# Patient Record
Sex: FEMALE | URBAN - METROPOLITAN AREA
[De-identification: names, ages, dates, MRNs, and addresses within clinical notes are randomized per-mention and may not be internally consistent; named-entity substitution may affect disease eponyms.]

---
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
## 2023-02-12 ENCOUNTER — HEALTH MAINTENANCE LETTER (OUTPATIENT)
Age: 18
End: 2023-02-12

## 2023-09-14 DIAGNOSIS — R30.0 DYSURIA: ICD-10-CM

## 2023-09-14 DIAGNOSIS — N94.10 FEMALE DYSPAREUNIA: Primary | ICD-10-CM

## 2023-11-14 ENCOUNTER — VIRTUAL VISIT (OUTPATIENT)
Dept: PSYCHOLOGY | Facility: CLINIC | Age: 18
End: 2023-11-14
Payer: COMMERCIAL

## 2023-11-14 DIAGNOSIS — F41.1 GENERALIZED ANXIETY DISORDER: Primary | ICD-10-CM

## 2023-11-14 DIAGNOSIS — F33.0 MAJOR DEPRESSIVE DISORDER, RECURRENT EPISODE, MILD (H): ICD-10-CM

## 2023-11-14 PROCEDURE — 90791 PSYCH DIAGNOSTIC EVALUATION: CPT | Mod: 95 | Performed by: PSYCHOLOGIST

## 2023-11-14 ASSESSMENT — COLUMBIA-SUICIDE SEVERITY RATING SCALE - C-SSRS
REASONS FOR IDEATION LIFETIME: MOSTLY TO END OR STOP THE PAIN (YOU COULDN'T GO ON LIVING WITH THE PAIN OR HOW YOU WERE FEELING)
1. IN THE PAST MONTH, HAVE YOU WISHED YOU WERE DEAD OR WISHED YOU COULD GO TO SLEEP AND NOT WAKE UP?: NO
ATTEMPT LIFETIME: NO
3. HAVE YOU BEEN THINKING ABOUT HOW YOU MIGHT KILL YOURSELF?: NO
TOTAL  NUMBER OF ABORTED OR SELF INTERRUPTED ATTEMPTS LIFETIME: NO
1. HAVE YOU WISHED YOU WERE DEAD OR WISHED YOU COULD GO TO SLEEP AND NOT WAKE UP?: YES
2. HAVE YOU ACTUALLY HAD ANY THOUGHTS OF KILLING YOURSELF?: NO
TOTAL  NUMBER OF INTERRUPTED ATTEMPTS LIFETIME: NO
6. HAVE YOU EVER DONE ANYTHING, STARTED TO DO ANYTHING, OR PREPARED TO DO ANYTHING TO END YOUR LIFE?: NO
4. HAVE YOU HAD THESE THOUGHTS AND HAD SOME INTENTION OF ACTING ON THEM?: NO
5. HAVE YOU STARTED TO WORK OUT OR WORKED OUT THE DETAILS OF HOW TO KILL YOURSELF? DO YOU INTEND TO CARRY OUT THIS PLAN?: NO
2. HAVE YOU ACTUALLY HAD ANY THOUGHTS OF KILLING YOURSELF?: YES

## 2023-11-14 ASSESSMENT — PATIENT HEALTH QUESTIONNAIRE - PHQ9
10. IF YOU CHECKED OFF ANY PROBLEMS, HOW DIFFICULT HAVE THESE PROBLEMS MADE IT FOR YOU TO DO YOUR WORK, TAKE CARE OF THINGS AT HOME, OR GET ALONG WITH OTHER PEOPLE: VERY DIFFICULT
SUM OF ALL RESPONSES TO PHQ QUESTIONS 1-9: 9
SUM OF ALL RESPONSES TO PHQ QUESTIONS 1-9: 9

## 2023-11-14 ASSESSMENT — ANXIETY QUESTIONNAIRES
GAD7 TOTAL SCORE: 6
IF YOU CHECKED OFF ANY PROBLEMS ON THIS QUESTIONNAIRE, HOW DIFFICULT HAVE THESE PROBLEMS MADE IT FOR YOU TO DO YOUR WORK, TAKE CARE OF THINGS AT HOME, OR GET ALONG WITH OTHER PEOPLE: VERY DIFFICULT
6. BECOMING EASILY ANNOYED OR IRRITABLE: SEVERAL DAYS
7. FEELING AFRAID AS IF SOMETHING AWFUL MIGHT HAPPEN: SEVERAL DAYS
GAD7 TOTAL SCORE: 6
1. FEELING NERVOUS, ANXIOUS, OR ON EDGE: MORE THAN HALF THE DAYS
2. NOT BEING ABLE TO STOP OR CONTROL WORRYING: SEVERAL DAYS
3. WORRYING TOO MUCH ABOUT DIFFERENT THINGS: SEVERAL DAYS
5. BEING SO RESTLESS THAT IT IS HARD TO SIT STILL: NOT AT ALL
4. TROUBLE RELAXING: NOT AT ALL
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
## 2023-12-15 ENCOUNTER — DOCUMENTATION ONLY (OUTPATIENT)
Dept: PSYCHOLOGY | Facility: CLINIC | Age: 18
End: 2023-12-15

## 2023-12-15 NOTE — PROGRESS NOTES
Name: Bryleigh Baker  MRN: [id number]  : 2005     Client completed the [place] Multiphasic Personality Inventory-3 (MMPI-3), a self-report personality inventory, as part of her evaluation. Validity scales indicate that the client was able to comprehend and respond relevantly to the test items. Client responded in an open and consistent manner, resulting in a valid profile. She reports a diffuse pattern of cognitive difficulties including memory problems, difficulties with attention and concentration and possible confusion. Her responses indicate significant emotional distress. She reports feeling sad and unhappy and being dissatisfied with her current life circumstances. She reports being passive, indecisive and inefficacious. She reports a lack of positive emotional experiences, significant anhedonia and lack of interest. She reports an above average level of stress. She reports worry, anxiety, and rumination. Client is reporting a lack of positive emotional experiences, anhedonia and lack of interest. Client is reporting an above average level of stress, anxiety, and excessive worry and rumination. She has fears that significantly restrict normal activity in and outside of the home.

## 2023-12-19 ENCOUNTER — VIRTUAL VISIT (OUTPATIENT)
Dept: PSYCHOLOGY | Facility: CLINIC | Age: 18
End: 2023-12-19
Payer: COMMERCIAL

## 2023-12-19 DIAGNOSIS — F33.0 MAJOR DEPRESSIVE DISORDER, RECURRENT EPISODE, MILD (H): ICD-10-CM

## 2023-12-19 DIAGNOSIS — F41.1 GENERALIZED ANXIETY DISORDER: Primary | ICD-10-CM

## 2023-12-19 PROCEDURE — 90832 PSYTX W PT 30 MINUTES: CPT | Mod: 95 | Performed by: PSYCHOLOGIST
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
## 2024-01-11 ENCOUNTER — DOCUMENTATION ONLY (OUTPATIENT)
Dept: PSYCHOLOGY | Facility: CLINIC | Age: 19
End: 2024-01-11

## 2024-01-11 NOTE — PROGRESS NOTES
"Client Name: Bryleigh Baker   MRN: [id number]  : 2005    Moo Adult ADHD Rating Scale-IV: Self and Other Reports (BAARS-IV)  The BAARS-IV assesses for symptoms of ADHD that are experienced in one's daily life. This assessment measure includes self and collateral rating scales designed to provide information regarding current and childhood symptoms of ADHD including inattention, hyperactivity, and impulsivity. Self-report scores are reported as percentiles. Scores at the 76th-83rd percentile are considered marginal, scores at the 84th-92nd percentile are considered borderline, scores at the 93rd-95th percentile are considered mild, scores at the 96th-98th percentile are considered moderate, and those at the 99th percentile are considered severe. Collateral or \"other\" rating scales are reported as number of symptoms observed in comparison to those reported by the client. Norms and percentile scores are not available for collateral reports.      Current Symptoms Scale--Self Report:   Client completed the self-report inventory of current symptoms. The results indicate that the client's Total ADHD Score was 36 which places them in the 90th percentile for overall ADHD symptoms. In addition, the client endorsed the following occur \"often\" or \"very often\": 4/9 (95th percentile) Inattention symptoms, 2/9 (85th percentile) Hyperactivity/Impulsivity symptoms, and 5/9 (94th percentile) Sluggish Cognitive Tempo symptoms. Overall, the results suggest the client is reporting mild symptoms of inattention and borderline symptoms of hyperactivity/impulsivity at this time.      Current Symptoms Scale--Other Report:  Client's friend completed the collateral report inventory of current symptoms. Based on the collateral contact's observation of symptoms, the client demonstrates the following \"often\" or \"very often\": 4/9 Inattention symptoms, 5/5 Hyperactivity symptoms, 4/4 Impulsivity symptoms, and 5/9 Sluggish Cognitive Tempo " "symptoms. The client's Total ADHD Score was 53. The collateral- and self-report scores are similar and suggest Client experiences symptoms of inattention at this time. Her friend noted more symptoms of hyperactivity/impulsivity than Client reported.     Childhood Symptoms Scale--Self-Report:  Client completed the self-report inventory of childhood symptoms. The results indicate that the client's Total ADHD Score was 24 which places them in the 51-75th percentile for overall ADHD symptoms in childhood. In addition, the client endorsed having experienced the following \"often\" or \"very often\": 0/9 (1-75th percentile) Inattention symptoms and 0/9 (1-75 percentile) Hyperactivity-Impulsivity symptoms. Overall, the results suggest the client did not report experiencing symptoms of ADHD in childhood.    Childhood Symptoms Scale--Other Report:  Client's childhood friend completed the collateral report inventory of childhood symptoms. Based on the collateral contact's recollection of client's childhood symptoms, the client demonstrated the following \"often\" or \"very often\": 3/9 Inattention symptoms and 5/9 Hyperactivity-Impulsivity symptoms. The client's Total ADHD Score was 44. The collateral- and self-report scores are discrepant. Her friend noted more symptoms of ADHD in childhood than Client reported.    Moo Functional Impairment Scale: Self and Other Reports (BFIS)  The BFIS is used to assess an individuals' psychosocial impairment in major life/daily activities that may be due to a mental health disorder. This assessment measure includes self and collateral rating scales. Self-report scores are reported as percentiles. Scores at the 76th-83rd percentile are considered marginal, scores at the 84th-92nd percentile are considered borderline, scores at the 93rd-95th percentile are considered mild, scores at the 96th-98th percentile are considered moderate, and those at the 99th percentile are considered severe. " "Collateral or \"other\" rating scales are reported as number of symptoms observed in comparison to those reported by the client. Norms and percentile scores are not available for collateral reports.      Results indicate the client identified impairment (scores at or greater than 93rd percentile) in the following areas: home-family, money management, and health maintenance. The client's Mean Impairment Score was 3.23 (51-75th percentile) indicating the client is not reporting impairment in functioning across domains. Client's friend completed the collateral rating scale, which indicated similar results (e.g., Mean Impairment Score of 3.4). She noted impairment in the areas of: work, social-strangers, and health maintenance.     Moo Deficits in Executive Functioning Scale (BDEFS)  The BDEFS is a measure used for evaluating dimensions of adult executive functioning in daily life. This assessment measure includes self and collateral rating scales. Self-report scores are reported as percentiles. Scores at the 76th-83rd percentile are considered marginal, scores at the 84th-92nd percentile are considered borderline, scores at the 93rd-95th percentile are considered mild, scores at the 96th-98th percentile are considered moderate, and those at the 99th percentile are considered severe. Collateral or \"other\" rating scales are reported as number of symptoms observed in comparison to those reported by the client. Norms and percentile scores are not available for collateral reports.      Results indicate the client's Total Executive Functioning Score was 195 (92nd percentile). The ADHD-Executive Functioning Index score was 22 (81st percentile). These scores suggest the client has borderline deficits in executive functioning. Client noted the following deficits: self-organization/problem solving (mild); and self-motivation (mild). Client's friend completed the collateral report which indicated similar results. They noted a " deficit in the area of: self-organization/problem-solving.    Generalized Anxiety Disorder Questionnaire (GIANFRANCO-7)  This questionnaire is designed to screen for anxiety in adults. Based on the client's score of 7, Client is reporting severe symptoms of anxiety at this time. Client endorsed the following symptoms of anxiety: feeling nervous/anxious/on edge; worrying about many different things; difficulty controlling worries, trouble relaxing; and feeling afraid as if something awful might happen.    Patient Health Questionnaire- 9 (PHQ-9)   This questionnaire is designed to screen for depression in adults. Based on the client's score of 12, Client is reporting moderate symptoms of depression at this time. Client identified the following symptoms of depression: little interest or pleasure in doing things; feeling down/depressed/hopeless; trouble falling or staying asleep or sleeping too much, feeling tired or having little energy, poor appetite or overeating; and poor concentration.

## 2024-01-22 ENCOUNTER — VIRTUAL VISIT (OUTPATIENT)
Dept: PSYCHOLOGY | Facility: CLINIC | Age: 19
End: 2024-01-22
Payer: COMMERCIAL

## 2024-01-22 ENCOUNTER — DOCUMENTATION ONLY (OUTPATIENT)
Dept: PSYCHOLOGY | Facility: CLINIC | Age: 19
End: 2024-01-22

## 2024-01-22 DIAGNOSIS — F33.0 MAJOR DEPRESSIVE DISORDER, RECURRENT EPISODE, MILD (H): ICD-10-CM

## 2024-01-22 DIAGNOSIS — F90.2 ATTENTION DEFICIT HYPERACTIVITY DISORDER, COMBINED TYPE: ICD-10-CM

## 2024-01-22 DIAGNOSIS — F41.1 GENERALIZED ANXIETY DISORDER: Primary | ICD-10-CM

## 2024-01-22 PROCEDURE — 96130 PSYCL TST EVAL PHYS/QHP 1ST: CPT | Mod: 93 | Performed by: PSYCHOLOGIST

## 2024-01-22 PROCEDURE — 96131 PSYCL TST EVAL PHYS/QHP EA: CPT | Mod: 93 | Performed by: PSYCHOLOGIST

## 2024-01-22 NOTE — PROGRESS NOTES
CNS Vital Signs Neurocognitive Battery   The CNS Vital Signs Neurocognitive Battery is a remotely-administered assessment comprised of seven core subtests to individually measure the patient's verbal memory, visual memory, motor speed, psychomotor speed, reaction time, focus, ability to sustain attention and ability to adapt to changing rules and tasks.        Above average domain scores indicate a standard score (SS) greater than 109 or a Percentile Rank ([place]) greater than 74, indicating a high functioning test subject. Average is a SS  or [place] 25-74, indicating normal function. Low Average is a SS 80-89 or [place] 9-24 indicating a slight deficit or impairment. Below Average is a SS 70-79 or [place] 2-8, indicating a moderate level of deficit or impairment. Very Low is a SS less than 70 or a [place] less than 2, indicating a deficit and impairment.  Validity Indicator denotes a guideline for representing the possibility of an invalid test or domain score, and can be influenced by patient understanding, effort, or other conditions.     The patient's results are detailed below:      Domain  Standard Score  Percentile  Description  Validity    Neurocognitive Index   88 21 Low Average Y   Composite?Memory?Measure  101 53 Average Y   Verbal Memory  100 50 Average Y   Visual?Memory  102 55 Average Y   Psychomotor Speed  87 19 Low Average Y   Reaction Time  68 2 Very Low Y   Complex Attention  98 45 Average Y   Cognitive Flexibility  88 21 Low Average Y   Processing Speed  85 16 Low Average Y   Executive Function  88 21 Low Average Y   Simple Attention  108 70 Average Y   Motor Speed  92 30 Average Y      Neurocognitive?Index?(NCI): Measures an average score derived from the domain scores or a general assessment of the overall neurocognitive status of the patient. The patient's NCI score is 88, with a percentile of 21, and falls within the Low Average range.      Composite?Memory: Measures how well subject can recognize, remember,  and retrieve words and geometric figures, and is comprised of the Visual and Verbal Memory domains. The patient's Composite Memory score is 101, with a percentile of 53, and falls within the Average range.      Verbal?Memory: Measures how well subject can recognize, remember, and retrieve words. The patient's Verbal Memory score is 100, with a percentile of 50, and falls within the Average range.      Visual?Memory: Measures how well subject can recognize, remember and retrieve geometric figures. The patient's Visual Memory score is 102, with a percentile of 55, and falls within the Average range.      Psychomotor?Speed: Measures how well a subject perceives, attends, responds to complex visual-perceptual information and performs simple fine motor coordination, and is comprised of the Motor Speed and Processing Speed indexes. The patient's Psychomotor Speed score is 87, with a percentile of 19, and falls within the Low Average range.      Reaction?Time: Measures how quickly the subject can react, in milliseconds, to a simple and increasingly complex direction set. The patient's Reaction Time score is 68, with a percentile of 2, and falls within the Very Low range.      Complex?Attention: Measures the ability to track and respond to a variety of stimuli over lengthy periods of time and/or perform complex mental tasks requiring vigilance quickly and accurately. The patient's Complex Attention score is 98, with a percentile of 45, and falls within the Average range.      Cognitive?Flexibility: Measures how well subject is able to adapt to rapidly changing and increasingly complex set of directions and/or to manipulate the information. The patient's Cognitive Flexibility score is 88, with a percentile of 21, and falls within the Low Average range.      Processing?Speed: Measures how well a subject recognizes and processes information i.e., perceiving, attending/responding to incoming information, motor speed, fine motor  coordination, and visual-perceptual ability. The patient's Processing Speed score is 85, with a percentile of 16, and falls within the Low Average range.      Executive?Function: Measures how well a subject recognizes rules, categories, and manages or navigates rapid decision making. The patient's Executive Function score is 88, with a percentile of 21, and falls within the Low Average range.      Simple?Attention: Measures the ability to track and respond to a single defined stimulus over lengthy periods of time while performing vigilance and response inhibition quickly and accurately to a simple task. The patient's Simple Attention score is 108, with a percentile of 70, and falls within the Average range.      Motor?Speed: Measure: Ability to perform simple movements to produce and satisfy an intention towards a manual action and goal. The patient's Motor Speed score is 92, with a percentile of 30, and falls within the Average range.

## 2024-01-22 NOTE — PROGRESS NOTES
[facility]   ADHD Evaluation      Patient: Bryleigh Baker  YOB: 2005  MRN: [id number]     Date(s) of assessment: Diagnostic Assessment (11/14/23; 12/19/23); MMPI-3 (Administered 12/18/23; Interpreted on 12/15/23); Moo self-report and collateral measures scored and interpreted (1/11/24); CNS Vital Signs (Administered 1/21/24; Interpreted 1/22/24)     Information about appointment:   Client attended two sessions to aid in determining client's mental health diagnosis or diagnoses and treatment recommendations that best address client concerns. Available medical records were reviewed. There were no previous psychological evaluations for review. A diagnostic assessment was conducted at the initial appointment. Client completed several rating scales to assist in assessing attention-related and other mental health symptoms that may be causing impairments in functioning. Rating scales were also completed by a collateral contact. Personality testing was also completed to aid in diagnostic clarification.   ?   Assessment tools:    Moo Adult ADHD Rating Scale-IV: Self and Other Reports (BAARS-IV), Moo Functional Impairment Scale: Self and Other Reports (BFIS), Moo Deficits in Executive Functioning Scale: Self and Other Reports (BDEFS), Patient Health Questionnaire-9 (PHQ-9), and Generalized Anxiety Disorder-7 (GIANFRANCO-7); [place] Multiphasic Personality Inventory-Third Edition (MMPI-3); CNS Vital Signs *Testing administered remotely    ?   Assessment Results:   Behavioral Observations:   Client arrived to each session on-time. She was pleasant and cooperative at all times. Client did not demonstrate difficulties with inattention or hyperactivity/impulsivity during the sessions. The following results are likely to be an accurate reflection of Client's current functioning.      Moo Adult ADHD Rating Scale-IV: Self and Other Reports (BAARS-IV)  The BAARS-IV assesses for symptoms of  "ADHD that are experienced in one's daily life. This assessment measure includes self and collateral rating scales designed to provide information regarding current and childhood symptoms of ADHD including inattention, hyperactivity, and impulsivity. Self-report scores are reported as percentiles. Scores at the 76th-83rd percentile are considered marginal, scores at the 84th-92nd percentile are considered borderline, scores at the 93rd-95th percentile are considered mild, scores at the 96th-98th percentile are considered moderate, and those at the 99th percentile are considered severe. Collateral or \"other\" rating scales are reported as number of symptoms observed in comparison to those reported by the client. Norms and percentile scores are not available for collateral reports.      Current Symptoms Scale--Self Report:   Client completed the self-report inventory of current symptoms. The results indicate that the client's Total ADHD Score was 36 which places them in the 90th percentile for overall ADHD symptoms. In addition, the client endorsed the following occur \"often\" or \"very often\": 4/9 (95th percentile) Inattention symptoms, 2/9 (85th percentile) Hyperactivity/Impulsivity symptoms, and 5/9 (94th percentile) Sluggish Cognitive Tempo symptoms. Overall, the results suggest the client is reporting mild symptoms of inattention and borderline symptoms of hyperactivity/impulsivity at this time.      Current Symptoms Scale--Other Report:  Client's friend completed the collateral report inventory of current symptoms. Based on the collateral contact's observation of symptoms, the client demonstrates the following \"often\" or \"very often\": 4/9 Inattention symptoms, 5/5 Hyperactivity symptoms, 4/4 Impulsivity symptoms, and 5/9 Sluggish Cognitive Tempo symptoms. The client's Total ADHD Score was 53. The collateral- and self-report scores are similar and suggest Client experiences symptoms of inattention at this time. Her " "friend noted more symptoms of hyperactivity/impulsivity than Client reported.     Childhood Symptoms Scale--Self-Report:  Client completed the self-report inventory of childhood symptoms. The results indicate that the client's Total ADHD Score was 24 which places them in the 51-75th percentile for overall ADHD symptoms in childhood. In addition, the client endorsed having experienced the following \"often\" or \"very often\": 0/9 (1-75th percentile) Inattention symptoms and 0/9 (1-75 percentile) Hyperactivity-Impulsivity symptoms. Overall, the results suggest the client did not report experiencing symptoms of ADHD in childhood.     Childhood Symptoms Scale--Other Report:  Client's childhood friend completed the collateral report inventory of childhood symptoms. Based on the collateral contact's recollection of client's childhood symptoms, the client demonstrated the following \"often\" or \"very often\": 3/9 Inattention symptoms and 5/9 Hyperactivity-Impulsivity symptoms. The client's Total ADHD Score was 44. The collateral- and self-report scores are discrepant. Her friend noted more symptoms of ADHD in childhood than Client reported.     Moo Functional Impairment Scale: Self and Other Reports (BFIS)  The BFIS is used to assess an individuals' psychosocial impairment in major life/daily activities that may be due to a mental health disorder. This assessment measure includes self and collateral rating scales. Self-report scores are reported as percentiles. Scores at the 76th-83rd percentile are considered marginal, scores at the 84th-92nd percentile are considered borderline, scores at the 93rd-95th percentile are considered mild, scores at the 96th-98th percentile are considered moderate, and those at the 99th percentile are considered severe. Collateral or \"other\" rating scales are reported as number of symptoms observed in comparison to those reported by the client. Norms and percentile scores are not available for " "collateral reports.      Results indicate the client identified impairment (scores at or greater than 93rd percentile) in the following areas: home-family, money management, and health maintenance. The client's Mean Impairment Score was 3.23 (51-75th percentile) indicating the client is not reporting impairment in functioning across domains. Client's friend completed the collateral rating scale, which indicated similar results (e.g., Mean Impairment Score of 3.4). She noted impairment in the areas of: work, social-strangers, and health maintenance.      Moo Deficits in Executive Functioning Scale (BDEFS)  The BDEFS is a measure used for evaluating dimensions of adult executive functioning in daily life. This assessment measure includes self and collateral rating scales. Self-report scores are reported as percentiles. Scores at the 76th-83rd percentile are considered marginal, scores at the 84th-92nd percentile are considered borderline, scores at the 93rd-95th percentile are considered mild, scores at the 96th-98th percentile are considered moderate, and those at the 99th percentile are considered severe. Collateral or \"other\" rating scales are reported as number of symptoms observed in comparison to those reported by the client. Norms and percentile scores are not available for collateral reports.      Results indicate the client's Total Executive Functioning Score was 195 (92nd percentile). The ADHD-Executive Functioning Index score was 22 (81st percentile). These scores suggest the client has borderline deficits in executive functioning. Client noted the following deficits: self-organization/problem solving (mild); and self-motivation (mild). Client's friend completed the collateral report which indicated similar results. They noted a deficit in the area of: self-organization/problem-solving.       CNS Vital Signs Neurocognitive Battery  The CNS Vital Signs Neurocognitive Battery is a remotely-administered " assessment comprised of seven core subtests to individually measure the patient's verbal memory, visual memory, motor speed, psychomotor speed, reaction time, focus, ability to sustain attention and ability to adapt to changing rules and tasks.        Above average domain scores indicate a standard score (SS) greater than 109 or a Percentile Rank (VA) greater than 74, indicating a high functioning test subject. Average is a SS  or VA 25-74, indicating normal function. Low Average is a SS 80-89 or VA 9-24 indicating a slight deficit or impairment. Below Average is a SS 70-79 or VA 2-8, indicating a moderate level of deficit or impairment. Very Low is a SS less than 70 or a VA less than 2, indicating a deficit and impairment.  Validity Indicator denotes a guideline for representing the possibility of an invalid test or domain score, and can be influenced by patient understanding, effort, or other conditions.     The patient's results are detailed below:      Domain  Standard Score  Percentile  Description  Validity    Neurocognitive Index   88 21 Low Average Y   Composite?Memory?Measure  101 53 Average Y   Verbal Memory  100 50 Average Y   Visual?Memory  102 55 Average Y   Psychomotor Speed  87 19 Low Average Y   Reaction Time  68 2 Very Low Y   Complex Attention  98 45 Average Y   Cognitive Flexibility  88 21 Low Average Y   Processing Speed  85 16 Low Average Y   Executive Function  88 21 Low Average Y   Simple Attention  108 70 Average Y   Motor Speed  92 30 Average Y      Neurocognitive?Index?(NCI): Measures an average score derived from the domain scores or a general assessment of the overall neurocognitive status of the patient. The patient's NCI score is 88, with a percentile of 21, and falls within the Low Average range.      Composite?Memory: Measures how well subject can recognize, remember, and retrieve words and geometric figures, and is comprised of the Visual and Verbal Memory domains. The  patient's Composite Memory score is 101, with a percentile of 53, and falls within the Average range.      Verbal?Memory: Measures how well subject can recognize, remember, and retrieve words. The patient's Verbal Memory score is 100, with a percentile of 50, and falls within the Average range.      Visual?Memory: Measures how well subject can recognize, remember and retrieve geometric figures. The patient's Visual Memory score is 102, with a percentile of 55, and falls within the Average range.      Psychomotor?Speed: Measures how well a subject perceives, attends, responds to complex visual-perceptual information and performs simple fine motor coordination, and is comprised of the Motor Speed and Processing Speed indexes. The patient's Psychomotor Speed score is 87, with a percentile of 19, and falls within the Low Average range.      Reaction?Time: Measures how quickly the subject can react, in milliseconds, to a simple and increasingly complex direction set. The patient's Reaction Time score is 68, with a percentile of 2, and falls within the Very Low range.      Complex?Attention: Measures the ability to track and respond to a variety of stimuli over lengthy periods of time and/or perform complex mental tasks requiring vigilance quickly and accurately. The patient's Complex Attention score is 98, with a percentile of 45, and falls within the Average range.      Cognitive?Flexibility: Measures how well subject is able to adapt to rapidly changing and increasingly complex set of directions and/or to manipulate the information. The patient's Cognitive Flexibility score is 88, with a percentile of 21, and falls within the Low Average range.      Processing?Speed: Measures how well a subject recognizes and processes information i.e., perceiving, attending/responding to incoming information, motor speed, fine motor coordination, and visual-perceptual ability. The patient's Processing Speed score is 85, with a  percentile of 16, and falls within the Low Average range.      Executive?Function: Measures how well a subject recognizes rules, categories, and manages or navigates rapid decision making. The patient's Executive Function score is 88, with a percentile of 21, and falls within the Low Average range.      Simple?Attention: Measures the ability to track and respond to a single defined stimulus over lengthy periods of time while performing vigilance and response inhibition quickly and accurately to a simple task. The patient's Simple Attention score is 108, with a percentile of 70, and falls within the Average range.      Motor?Speed: Measure: Ability to perform simple movements to produce and satisfy an intention towards a manual action and goal. The patient's Motor Speed score is 92, with a percentile of 30, and falls within the Average range.      Summary of [place] Multiphasic Personality Inventory--Third Edition    Client completed the [place] Multiphasic Personality Inventory-3 (MMPI-3), a self-report personality inventory, as part of her evaluation. Validity scales indicate that the client was able to comprehend and respond relevantly to the test items. Client responded in an open and consistent manner, resulting in a valid profile. She reports a diffuse pattern of cognitive difficulties including memory problems, difficulties with attention and concentration and possible confusion. Her responses indicate significant emotional distress. She reports feeling sad and unhappy and being dissatisfied with her current life circumstances. She reports being passive, indecisive and inefficacious. She reports a lack of positive emotional experiences, significant anhedonia and lack of interest. She reports an above average level of stress. She reports worry, anxiety, and rumination. Client is reporting a lack of positive emotional experiences, anhedonia and lack of interest. Client is reporting an above average level of  stress, anxiety, and excessive worry and rumination. She has fears that significantly restrict normal activity in and outside of the home.      Generalized Anxiety Disorder Questionnaire (GIANFRANCO-7)  This questionnaire is designed to screen for anxiety in adults. Based on the client's score of 7, Client is reporting mild symptoms of anxiety at this time. Client endorsed the following symptoms of anxiety: feeling nervous/anxious/on edge; worrying about many different things; difficulty controlling worries, trouble relaxing; and feeling afraid as if something awful might happen.     Patient Health Questionnaire- 9 (PHQ-9)   This questionnaire is designed to screen for depression in adults. Based on the client's score of 12, Client is reporting moderate symptoms of depression at this time. Client identified the following symptoms of depression: little interest or pleasure in doing things; feeling down/depressed/hopeless; trouble falling or staying asleep or sleeping too much, feeling tired or having little energy, poor appetite or overeating; and poor concentration.    ?   Summary (based on clinical interview, review of records, test results):   Client is a 18-year-old, , single female. Client was referred for a diagnostic assessment by PCP. The purpose of this evaluation is to: provide treatment recommendations and clarify diagnosis. Client had difficulty self-generating examples of ADHD symptoms. Client's presenting concerns include: Client can't take notes and listen at the same time. She can't do both at the same time. If she writes something down, she isn't able to also listen to the teacher at the same time. If she is told a lot of things to do, she won't remember it. She needs to write things down. She gets distracted at work talking to coworkers. She will get off task. She has been accidentally double-booking herself (she will forget she has plans and will schedule something else). Client's likes to try to  "keep her room clean and tidy (this helps to keep her mind clear). Her room needs to be clean before she can do her homework or something else. She does not misplace or lose things. She is punctual and on time for things. Sometimes she can get things done right away (depending on if she likes the class or how urgent things are) and other times she will procrastinate until a few days before the deadline to get started. If Client is really tired, she might zone out when others are talking, but if she is not tired, she can listen and follow along fine. Client feels she can talk excessively at times (\"I chime in with my experience\"). She interrupts people at times but catches herself and will stop and apologize. Client has difficulty sitting to watch a TV show when she is feeling anxious (\"I will feel super restless and be shaking my leg\"). She will bounce between tasks sometimes (e.g., start cleaning in room and then put something in the bathroom). She has a list to keep her on track and will finish all of the tasks. Client stated that symptoms have resulted in the following functional impairments: academic performance. Client reported that she has not completed a previous ADHD diagnostic assessment. Client has not received a previous diagnosis of ADHD. Client reported that medication has not been prescribed medication to address these problems. Client reported that these problems began in college. Client's therapist recommended testing, but noted that her symptoms haven't affected her schooling \"enough\" to be referred in past years. Client has not attempted to resolve these concerns in the past. Client reported the following previous diagnoses which includes: Depression and anxiety. Client reported symptoms began in adolescence (7th/8th grade). She was bullied when she was young. Client has received mental health services in the past:  medications from PCP and counseling. Psychiatric Hospitalizations: None. Client " "denies a history of civil commitment. Client is receiving other mental health services. These include medication from PCP and counseling. Client is prescribed Lexapro by PCP. She works with a therapist through [facility]. They have been working together for the last 8 years. This has been helpful. She did not report a personal history of chemical dependence.    Client reported she grew up in [place], [place]. She was raised by her biological parents. Her parents  /  when she was 14 years old. The divorce was \"crazy.\" This situation was stressful (the divorce was finalized in 2020). She was the second born of 3 children. She has an older sister and a younger sister. Client reported that their childhood was \"really good until the .  She felt safe and loved in her home. Her parents would argue at times. They were all busy in sports and activities. Client was bullied in school. They saw extended family and were close. Client described their current relationships with family of origin as \"pretty good.\" She and her father are \"in a spot\" right now. She is close with her mother and she gets along fine with her sisters. Client reported the following relationship history: none reported. Client's current relationship status is single for 20 years. Client identified their sexual orientation as heterosexual. She does not have children. Client identified parents; siblings; friends; therapist as part of their support system. Client identified the quality of these relationships as inconsistent.    As a child, client reported that she had problems getting ready for school in the morning and had problems managing temper with frequent emotional outbursts. Client reported no difficulty with childhood peer relationships. As a child, client reported having regular and consistent sleep patterns.  Client reported currently experiencing sleep disturbance, including: sleep apnea. Client reported sleeping " "approximately 6 hours per night. Client reported that she has completed a sleep study ([facility]) - she has difficulty using her c-pap because it falls off.       Client's highest education level was associate degree / vocational certificate. Client graduated high school in 2021. She estimated she obtained mostly As and some Bs. During the elementary, middle, and high school years, patient recalls academic strengths in the area of math and geography and history. Client reported experiencing academic problems in science. Client did not identify any learning problems. Client did not receive tutoring services during the school years. Client did not receive special education services. Client reported no particular problems during the school years. She would do homework in the evenings after school, sports activities and dinner. Client was able to pay attention in class. She was not disruptive. She did not have attendance issues. Client did attend post-secondary school. 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 She explained that she struggled more in college. There was more variance in her grades (some As, some Bs, and some Cs). She wasn't skipping class. She was able to focus in class. Client was also working while taking college courses, so she had less time to get homework done. She procrastinated a bit more in college.      Client reported that she is currently employed part-time. 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 She has been at [employer] for almost 6 months and [employer] has been 2 years. Client reported that the current job is a good fit for her skills and personality. Client reported that she often felt bored and distractible behavior. Client could get distracted talking with other people or \"seeing something that I thought was cool.\" She might make small mistakes here and there, but she hasn't had an issue with this. She doesn't feel bored because there is usually " "a lot to do at work to keep busy. When she was a  at [employer], it could be slow and that was boring. When she starts to lose interest in the job, she would show up \"barely on time\" (\"but to me that is late, because being on time for me means being 5 minutes early\"). The client's work history includes: [employer], [employer], [employer], [employer]. The longest period of employment has been 2 years. Client has not been terminated from a place of employment.      Results of testing were suggestive of ADHD.?Client is reporting symptoms of inattention and hyperactivity/impulsivity at this time. The collateral report (childhood friend) was commensurate and indicated current and childhood symptoms of ADHD. Client indicated that she did not struggle with these issues in younger years, but noticed that she had more difficulty in college with procrastination, time management, and task completion.    Client's self-report measures suggest they are experiencing mild anxiety and moderate depression at this time. Client completed a brief virtual assessment examining brief core brain function domains. Results of this assessment demonstrated that, overall, Client's scores fell between the Average and Low Average ranges. Client performed in the Low Average range on the indices of Cognitive Flexibility, Processing Speed, and Executive Function. Client seemed to have difficulty on a task assessing how well a subject is able to adapt to rapidly changing and increasingly complex set of directions. Errors on such tasks may be due to confusion or impulsive responding. Attention deficit may be apparent.     Based on the results of clinical interview and psychological testing, the client meets DSM-5 criteria for diagnoses of ADHD Combined Presentation, Generalized Anxiety Disorder and Major Depressive Disorder, Recurrent, Mild. Client will be provided with the results of testing, diagnosis, and recommendations in her last " appointment.       DSM5 Diagnoses: (Sustained by DSM5 Criteria Listed Above)       ADHD Combined Presentation (F90.2)    Generalized Anxiety Disorder (F41.1)    Major Depressive Disorder, Recurrent, Mild (F33.0)    Psychosocial & Contextual Factors: college student    Recommendations:      1. Schedule a medication evaluation with your physician. Medications are often beneficial in treating anxiety and depression symptoms as well as ADHD. It will be important that each condition is treated, as treatment for one without the other may lead to an increase in symptoms (e.g., treating ADHD, but not anxiety, can lead to increased anxiety).?   ??   2. Access resources through websites, books, and articles such as those provided in the Adult ADHD Symptom Management handout. ??   ??   3. Consider working with an ADHD  or individual therapist to learn skills to?assist with symptom management, as well as ways to improve relationships, etc. that may have been impacted by your symptoms.?   ??   4. Individual therapy is recommended. Therapies focused on identifying and challenging problematic thought and behavior patterns while increasing the use of healthy coping skills has been found to be effective in treating anxiety and depression. It will be important to set goals in this therapy and work actively toward achieving short-term successes that lead to the completion of each goal. Action-oriented therapies, such as CBT and ACT (Acceptance and Commitment Therapy) are particularly recommended for the treatment of chronic anxiety and depression.  ?   5. The use of behavioral strategies such as diaphragmatic breathing, guided imagery, and mindfulness is often helpful in the management of anxiety symptoms.      6. ?The following compensatory strategies may be useful to cope with reported inattention symptoms:    a. Maintaining a predictable routine and structured environment that incorporates prioritized checklists and reminders  (e.g., Post-Its).   b. When completing tasks, try to focus on one task at a time and complete it in its entirety before moving on to the next task.   c. Minimize background distractions when working on complex tasks. For example, TV, radio or ongoing conversations in the background may hinder ability to focus on the task at hand.   d. Take regular breaks from tasks that require prolonged attention. In general, regular breaks from complex tasks can help prevent lapses in attention, which can result in errors.   e. Outline the steps required to complete a task prior to beginning it, which can help ensure an organized approach. Use the outline to refer to throughout the task as a reminder of the steps to be completed.      Vianney Glover, PhD, LP   Licensed Psychologist

## 2024-01-22 NOTE — PROGRESS NOTES
"Client Name: Bryleigh Baker   Date: 1/22/24       Service Type: Individual (ADHD Evaluation feedback session)   ?   Session Start Time: 1:00 Session End Time: 1:18     ?   Session Length: 18 minutes    ?   Session #: (feedback)   ?   Attendees: Client attended alone      Telemedicine Visit: The patient's condition can be safely assessed and treated via synchronous audio and visual telemedicine encounter.      Reason for Telemedicine Visit: Services only offered telehealth    Originating Site (Patient Location): Patient's home      Distant Location (provider location):  Off-site    Consent:  The patient/guardian has verbally consented to: the potential risks and benefits of telemedicine (video visit) versus in person care; bill my insurance or make self-payment for services provided; and responsibility for payment of non-covered services.     Mode of Communication: Phone call    As the provider I attest to compliance with applicable laws and regulations related to telemedicine.    The patient has been notified of the following:      \"We have found that certain health care needs can be provided without the need for a face to face visit.  This service lets us provide the care you need with a phone conversation.       I will have full access to your [facility] medical record during this entire phone call.   I will be taking notes for your medical record.      Since this is like an office visit, we will bill your insurance company for this service.       There are potential benefits and risks of telephone visits (e.g. limits to patient confidentiality) that differ from in-person visits.?  Confidentiality still applies for telephone services, and nobody will record the visit.  It is important to be in a quiet, private space that is free of distractions (including cell phone or other devices) during the visit.??      If during the course of the call I believe a telephone visit is not appropriate, you will not be charged for " "this service\"     Consent has been obtained for this service by care team member: Yes     The patient has been notified of the following:      \"We have found that certain health care needs can be provided without the need for a face to face visit.  This service lets us provide the care you need with a phone conversation.       I will have full access to your [facility] medical record during this entire phone call.   I will be taking notes for your medical record.      Since this is like an office visit, we will bill your insurance company for this service.       There are potential benefits and risks of telephone visits (e.g. limits to patient confidentiality) that differ from in-person visits.?  Confidentiality still applies for telephone services, and nobody will record the visit.  It is important to be in a quiet, private space that is free of distractions (including cell phone or other devices) during the visit.??      If during the course of the call I believe a telephone visit is not appropriate, you will not be charged for this service\"     Consent has been obtained for this service by care team member: Yes     ?   DATA   ?   ?   Treatment Objective(s) Addressed in This Session:    Provided feedback on ADHD evaluation. Reviewed test results in depth and answered client's questions. Client diagnosed with ADHD Combined Presentation, Generalized Anxiety Disorder and Major Depressive Disorder, Recurrent, Moderate. This provider also completed full written report of evaluation, including integration of testing data, summary, and recommendations. Please see Documentation Only dated 1/22/24.   ?   Progress on / Status of Treatment Objective(s) / Homework:    Completed    ?   Intervention:   ADHD Evaluation feedback; Reviewed report (can be found in Documentation Only encounter dated 1/22/24); Client was appreciative of the feedback and expressed understanding of the diagnoses. She plans to meet with psychiatry to " discuss medication management. She indicated her depression has worsened somewhat this winter and she is working closely with her therapist to address this.   ?   ?   ASSESSMENT: Current Emotional / Mental Status (status of significant symptoms):   Risk status (Self / Other harm or suicidal ideation)   Client denies current fears or concerns for personal safety.   Client denies current or recent suicidal ideation or behaviors.   Client denies current or recent homicidal ideation or behaviors.   Client denies current or recent self-injurious behavior or ideation.   Client denies other safety concerns.   A safety and risk management plan has not been developed at this time, however client was given the after-hours number / 911 should there be a change in any of these risk factors.   ?   Appearance: unable to assess on phone   Eye Contact: unable to assess on phone   Psychomotor Behavior: unable to assess on phone   Attitude: Cooperative    Orientation: All   Speech   Rate / Production: Normal    Volume: Normal    Mood: Normal   Affect: Appropriate    Thought Content: Clear    Thought Form: Coherent Logical    Insight: Good    ?   Medication Review:   Client is prescribed Lexapro by PCP     Medication Compliance:   Yes  ?   Changes in Health Issues:   None reported   ?   Chemical Use Review:   Substance Use: Chemical use reviewed, no active concerns identified    ?   Tobacco Use: No current tobacco use.    ?   Collateral Reports Completed:   Routed note to Care Team Member(s)   ?   PLAN: (Homework, other)   ?   Recommendations:     1. Schedule a medication evaluation with your physician. Medications are often beneficial in treating anxiety and depression symptoms as well as ADHD. It will be important that each condition is treated, as treatment for one without the other may lead to an increase in symptoms (e.g., treating ADHD, but not anxiety, can lead to increased anxiety).?   ??   2. Access resources through  websites, books, and articles such as those provided in the Adult ADHD Symptom Management handout. ??   ??   3. Consider working with an ADHD  or individual therapist to learn skills to?assist with symptom management, as well as ways to improve relationships, etc. that may have been impacted by your symptoms.?   ??   4. Individual therapy is recommended. Therapies focused on identifying and challenging problematic thought and behavior patterns while increasing the use of healthy coping skills has been found to be effective in treating anxiety and depression. It will be important to set goals in this therapy and work actively toward achieving short-term successes that lead to the completion of each goal. Action-oriented therapies, such as CBT and ACT (Acceptance and Commitment Therapy) are particularly recommended for the treatment of chronic anxiety and depression.  ?   5. The use of behavioral strategies such as diaphragmatic breathing, guided imagery, and mindfulness is often helpful in the management of anxiety symptoms.      6. ?The following compensatory strategies may be useful to cope with reported inattention symptoms:    a. Maintaining a predictable routine and structured environment that incorporates prioritized checklists and reminders (e.g., Post-Its).   b. When completing tasks, try to focus on one task at a time and complete it in its entirety before moving on to the next task.   c. Minimize background distractions when working on complex tasks. For example, TV, radio or ongoing conversations in the background may hinder ability to focus on the task at hand.   d. Take regular breaks from tasks that require prolonged attention. In general, regular breaks from complex tasks can help prevent lapses in attention, which can result in errors.   e. Outline the steps required to complete a task prior to beginning it, which can help ensure an organized approach. Use the outline to refer to throughout the  task as a reminder of the steps to be completed.      Vianney Glover, PhD,    Licensed Psychologist     Psychological Testing    Billing/Services Summary    ?    Testing Evaluation Services  Base: 95387   (1st 60 mins)  Add-on: [id number]   (each addtl 60 mins)    Record Review and Clarify Referral Question    (12:40/1:00), (1/10/24)  20 minutes    Integration/Report Generation    (12:00/1:00), (12/15/23) - MMPI-3  (11:00/12:00), (1/11/24) - Moo Scales  (11:00/12:00), (1/22/24) - CNS Vital Signs  (12:00/1:00), (1/22/24) - Report Writing 60 minutes   60 minutes  60 minutes  60 minutes   Interactive Feedback Session   (1:00/1:18), (1/22/24) 18 minutes    Total Time:  278 minutes (4 hours, 38 minutes)    Total Units:  1  4   ?    ?    Diagnosis(es): (ICD-10)   ADHD Combined Presentation (F90.2)   Generalized Anxiety Disorder (F41.1)  Major Depressive Disorder, Recurrent, Mild (F33.0)

## 2025-02-16 ENCOUNTER — HEALTH MAINTENANCE LETTER (OUTPATIENT)
Age: 20
End: 2025-02-16

## 2025-06-02 ENCOUNTER — PATIENT OUTREACH (OUTPATIENT)
Dept: CARE COORDINATION | Facility: CLINIC | Age: 20
End: 2025-06-02

## 2025-06-04 ENCOUNTER — PATIENT OUTREACH (OUTPATIENT)
Dept: CARE COORDINATION | Facility: CLINIC | Age: 20
End: 2025-06-04